# Patient Record
Sex: FEMALE | Race: WHITE
[De-identification: names, ages, dates, MRNs, and addresses within clinical notes are randomized per-mention and may not be internally consistent; named-entity substitution may affect disease eponyms.]

---

## 2019-11-19 ENCOUNTER — HOSPITAL ENCOUNTER (EMERGENCY)
Dept: HOSPITAL 25 - UCEAST | Age: 69
Discharge: HOME | End: 2019-11-19
Payer: MEDICARE

## 2019-11-19 VITALS — DIASTOLIC BLOOD PRESSURE: 70 MMHG | SYSTOLIC BLOOD PRESSURE: 146 MMHG

## 2019-11-19 DIAGNOSIS — L03.012: Primary | ICD-10-CM

## 2019-11-19 PROCEDURE — G0463 HOSPITAL OUTPT CLINIC VISIT: HCPCS

## 2019-11-19 PROCEDURE — 99212 OFFICE O/P EST SF 10 MIN: CPT

## 2019-11-19 NOTE — UC
Skin Complaint HPI





- HPI Summary


HPI Summary: 





70 yo woman with a swollen left fingertip x 6 days with increasing pain. 





- History of Current Complaint


Chief Complaint: UCUpperExtremity


Time Seen by Provider: 11/19/19 10:52


Stated Complaint: RED SWOLLEN FINGER TIP


Hx Obtained From: Patient


Onset/Duration: Gradual Onset, Lasting Days - 6


Skin Exposure Onset/Duration: Days Ago


Onset Severity: Mild


Pain Intensity: 3





- Allergy/Home Medications


Allergies/Adverse Reactions: 


 Allergies











Allergy/AdvReac Type Severity Reaction Status Date / Time


 


No Known Allergies Allergy   Unverified 11/19/19 10:50














PMH/Surg Hx/FS Hx/Imm Hx


Previously Healthy: Yes





- Surgical History


Surgical History: Yes


Surgery Procedure, Year, and Place: L plantar fasciitis.  2009 R rotator cuff 

repair - NO METAL PER PATIENT.  Tonsilectomy





- Family History


Known Family History: Positive: Non-Contributory





- Social History


Occupation: Retired


Lives: With Family


Alcohol Use: Weekly


Substance Use Type: None


Smoking Status (MU): Never Smoked Tobacco





Review of Systems


All Other Systems Reviewed And Are Negative: Yes


Constitutional: Positive: Negative


Skin: Positive: Negative


Eyes: Positive: Negative


ENT: Positive: Negative


Respiratory: Positive: Negative


Cardiovascular: Positive: Negative


Gastrointestinal: Positive: Negative


Genitourinary: Positive: Negative


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


Neurological: Positive: Negative


Is Patient Immunocompromised?: No





Physical Exam


Triage Information Reviewed: Yes


Appearance: Well-Appearing, Pain Distress - mild


Vital Signs: 


 Initial Vital Signs











Temp  97.3 F   11/19/19 10:45


 


Pulse  76   11/19/19 10:45


 


Resp  18   11/19/19 10:45


 


BP  146/70   11/19/19 10:45


 


Pulse Ox  97   11/19/19 10:45











ENT: Positive: Normal ENT inspection


Respiratory: Positive: Lungs clear, Normal breath sounds


Cardiovascular: Positive: RRR, No Murmur


Skin Exam: Other - left index finger medial margin with tense paronychia 

distally, with erythema extending to the DIP joint. Lifted margin with 18 gauge 

needle for moderate amount of purulent material.





Course/Dx





- Course


Course Of Treatment: 





drainage of paronychia without I+D


antibiotic for treatment of associated cellulitis





- Differential Diagnoses - Skin Complaint


Differential Diagnoses: Cellulitis, Other - paronychia





- Diagnoses


Provider Diagnosis: 


 Paronychia of left index finger








Discharge ED





- Sign-Out/Discharge


Documenting (check all that apply): Patient Departure


All imaging exams completed and their final reports reviewed: No Studies





- Discharge Plan


Condition: Stable


Disposition: HOME


Prescriptions: 


cephALEXin [Keflex] 500 mg PO TID #15 capsule


Patient Education Materials:  Paronychia (ED)


Referrals: 


Mary Ojeda DO [Primary Care Provider] - 


Additional Instructions: 


Soak your finger in warm water and salt for 15 minutes every feew hours today, 

gently expressing further discharge. 


Cephalexin has been prescribed to treat the soft tissue infection. 


Use ibuprofen as needed for control of pain. 





- Billing Disposition and Condition


Condition: STABLE


Disposition: Home